# Patient Record
(demographics unavailable — no encounter records)

---

## 2024-11-06 NOTE — ASSESSMENT
[FreeTextEntry1] : #Seborrheic dermatitis - chronic; flaring around nose - Diagnosis, chronic nature, disease course, treatment options and goals of therapy discussed - c/w ketoconazole 2% shampoo EVERY OTHER DAY to affected areas on scalp.   - c/w mometasone solution [id number]-[id number] as needed for itch on scalp. SED; avoid use on face or neck - Start ketoconazole 2% cream BID PRN rash on face  #Hypopigmentation, L forearm ?atopic dermatitis/P alba - new dx of uncertain prognosis Wood's lamp negative - Potential Dx discussed - Start tacrolimus 0.1% ointment BID PRN - Photo on pt's phone

## 2024-11-06 NOTE — HISTORY OF PRESENT ILLNESS
[FreeTextEntry1] : rash, seb derm [de-identified] : RADHA ORELLANA is a 35 year old F who present for f/u as below.   #Seb derm of scalp - controlled with keto shampoo and PRN mometasone solution #White spots L forearm x3 weeks. Not itchy, but getting bigger. Denies hx of eczema, +KP in family #Rash around nose, using vaseline    Personal hx of skin cancer: no FHx of skin cancer: no Social hx: [place] [facility]

## 2024-11-06 NOTE — PHYSICAL EXAM
[Declined] : declined [FreeTextEntry3] : pink scaly plaques nasolabial folds  faint hypopigmented patches without scale L forearm and L ac fossa. no scale

## 2025-04-14 NOTE — HISTORY OF PRESENT ILLNESS
[FreeTextEntry1] : growth, rash [de-identified] : RADHA ORELLANA is a 35 year old F currently pregnant who present for f/u as below.   #Rash on superior vulva, x3 weeks. currently pregnant. Used ketoconazole cream + HC 1% cream with relief after 2 weeks.  However, pt notes new growths at same site.  Negative pap smear.  #Seb derm - scalp controlled with keto shampoo and PRN mometasone solution. Also using keto cream around nose. #White spots L forearm x3 weeks. Not itchy, but getting bigger. Denies hx of eczema, +KP in family - 4/2025: improved with tacrolimus ointment since 11/2024    Personal hx of skin cancer: no FHx of skin cancer: no Social hx: pharmacy in derm

## 2025-04-14 NOTE — PHYSICAL EXAM
[Declined] : declined [FreeTextEntry3] : pink patch on superior labia and lower mons, resolving verrucous pink papules x5 in same site

## 2025-06-19 NOTE — PHYSICAL EXAM
[Declined] : declined [FreeTextEntry3] : Focused skin exam performed  The relevant portions of the exam were performed today  AAOx3, NAD, well-appearing / pleasant Focused examination within normal limits with the exception of:  Hyperpigmented and light pink thin plaques with slight desquamation on the b/l extensor elbows  Diffuse mild xerosis  Scalp and face clear

## 2025-06-19 NOTE — ASSESSMENT
[FreeTextEntry1] : #Dermatitis of uncertain etiology, flaring on extensor elbows #Post-inflammatory hyperpigmentation - As mostly #pruritic with desquamation today, will send for group 5 TCS, desonide ointment, apply BID prn, apply to AA BID as needed. SED. Topical corticosteroids, including those of high potency are safe in pregnancy and preferred to systemic steroids when feasible. Just as in non-pregnant patients, steroids in pregnant women should be used at the lowest effective dose.  - Continue with gentle skincare.  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  - Continue to monitor arthralgias and will update Dr. Guevara should erythema recur. Take photos and monitor for PsO.

## 2025-06-19 NOTE — HISTORY OF PRESENT ILLNESS
[de-identified] : 35-year-old female 29 weeks pregnant presenting today for an itchy rash on her elbows x weeks. Was very red, flakey, now improved and darker brown with her daughter's tacrolimus 0.03% ointment, applying BID. History of seborrheic dermatitis on the scalp, well-controlled with keto shampoo and PRN mometasone solution. Also using keto cream around nose. Never happened previously.  History of psoriasis in her aunt and grandfather.  Follows with Dr. Guevara, last seen April 2025 for hypopigmentation on the left forearm, asx. DDX included AD, P alba. Srivastava lamp neg. Denies hx of eczema, +KP in family  No personal or family history of skin cancer Social hx: pharmacy in derm    [FreeTextEntry1] : RPA: itchy rash

## 2025-06-19 NOTE — HISTORY OF PRESENT ILLNESS
[FreeTextEntry1] : RPA: itchy rash [de-identified] : 35-year-old female 29 weeks pregnant presenting today for an itchy rash on her elbows x weeks. Was very red, flakey, now improved and darker brown with her daughter's tacrolimus 0.03% ointment, applying BID. History of seborrheic dermatitis on the scalp, well-controlled with keto shampoo and PRN mometasone solution. Also using keto cream around nose. Never happened previously.  History of psoriasis in her aunt and grandfather.  Follows with Dr. Guevara, last seen April 2025 for hypopigmentation on the left forearm, asx. DDX included AD, P alba. Srivastava lamp neg. Denies hx of eczema, +KP in family  No personal or family history of skin cancer Social hx: pharmacy in derm